# Patient Record
Sex: FEMALE | Race: BLACK OR AFRICAN AMERICAN | NOT HISPANIC OR LATINO | URBAN - NONMETROPOLITAN AREA
[De-identification: names, ages, dates, MRNs, and addresses within clinical notes are randomized per-mention and may not be internally consistent; named-entity substitution may affect disease eponyms.]

---

## 2019-07-11 ENCOUNTER — IMPORTED ENCOUNTER (OUTPATIENT)
Dept: URBAN - NONMETROPOLITAN AREA CLINIC 1 | Facility: CLINIC | Age: 64
End: 2019-07-11

## 2019-07-11 PROBLEM — H25.813: Noted: 2019-07-11

## 2019-07-11 PROCEDURE — 99214 OFFICE O/P EST MOD 30 MIN: CPT

## 2019-07-11 PROCEDURE — 92015 DETERMINE REFRACTIVE STATE: CPT

## 2019-07-11 NOTE — PATIENT DISCUSSION
Presbyopia OU- Discussed refractive status with patient- New glasses Rx given today recommend update- Continue to monitor Combined Cataracts OU- Discussed diagnosis in detail with patient- Reviewed signs and symptoms of cataract progression- Informed patient that her cataract progression could be whats affecting her vision- Mild progression noted OU- Continue to monitorFloaters OU -  Discussed findings of exam in detail with the patient. -  The risk of retinal detachment in patients with PVDs was discussed with the patient and the warning signs of retinal detachment were carefully reviewed with the patient. -  The patient was warned to return to the office or contact the ophthalmologist on call immediately if they experience signs of retinal detachment. - Conitnue to monitor; 's Notes: MR  7/11/19DFE  7/11/19

## 2022-04-09 ASSESSMENT — VISUAL ACUITY
OD_GLARE: 20/50
OU_CC: 20/25
OD_SC: 20/30-
OS_SC: 20/30-
OS_GLARE: 20/70

## 2022-04-09 ASSESSMENT — TONOMETRY
OD_IOP_MMHG: 13
OS_IOP_MMHG: 13

## 2024-06-10 ENCOUNTER — OFFICE VISIT (OUTPATIENT)
Age: 69
End: 2024-06-10
Payer: COMMERCIAL

## 2024-06-10 VITALS
SYSTOLIC BLOOD PRESSURE: 116 MMHG | HEIGHT: 66 IN | OXYGEN SATURATION: 99 % | DIASTOLIC BLOOD PRESSURE: 58 MMHG | BODY MASS INDEX: 27.64 KG/M2 | WEIGHT: 172 LBS | HEART RATE: 74 BPM

## 2024-06-10 DIAGNOSIS — I48.0 PAROXYSMAL ATRIAL FIBRILLATION (HCC): ICD-10-CM

## 2024-06-10 DIAGNOSIS — I05.9 MITRAL VALVE DISEASE: Primary | ICD-10-CM

## 2024-06-10 DIAGNOSIS — R01.1 SYSTOLIC MURMUR: ICD-10-CM

## 2024-06-10 PROCEDURE — 1123F ACP DISCUSS/DSCN MKR DOCD: CPT | Performed by: INTERNAL MEDICINE

## 2024-06-10 PROCEDURE — 99214 OFFICE O/P EST MOD 30 MIN: CPT | Performed by: INTERNAL MEDICINE

## 2024-06-10 RX ORDER — ERGOCALCIFEROL 1.25 MG/1
50000 CAPSULE ORAL WEEKLY
COMMUNITY

## 2024-06-10 ASSESSMENT — PATIENT HEALTH QUESTIONNAIRE - PHQ9
1. LITTLE INTEREST OR PLEASURE IN DOING THINGS: NOT AT ALL
SUM OF ALL RESPONSES TO PHQ9 QUESTIONS 1 & 2: 0
SUM OF ALL RESPONSES TO PHQ QUESTIONS 1-9: 0
2. FEELING DOWN, DEPRESSED OR HOPELESS: NOT AT ALL
SUM OF ALL RESPONSES TO PHQ QUESTIONS 1-9: 0

## 2024-06-10 ASSESSMENT — ENCOUNTER SYMPTOMS
GASTROINTESTINAL NEGATIVE: 1
EYES NEGATIVE: 1
SHORTNESS OF BREATH: 0
ALLERGIC/IMMUNOLOGIC NEGATIVE: 1

## 2024-06-10 NOTE — PROGRESS NOTES
Karime Duncan (:  1955) is a 69 y.o. female,Established patient, here for evaluation of the following chief complaint(s):  Follow-up    1 year ago    Subjective   SUBJECTIVE/OBJECTIVE:  HPI  Patient presents today for follow-up. She has a history of mitral valve disease. She has a history of mitral valve prolapse diagnosed when she was 18 years of age. She developed atrial fibrillation once in . She states in , while living in [place], she developed progressive shortness of breath felt due to valve regurgitation. This led to a valve repair closure operative procedure in  in [place], [place]. Since that time, she has done well without chest pain or shortness of breath. No palpitations or tachycardia. No syncope or presyncope. No orthopnea, or paroxysmal nocturnal dyspnea. No history of thyroid disease or stroke. She has a history of hypertension noted in the distant past, but is on no medication. She has hyperlipidemia and was started recently on pravastatin. LDL was under 35 mg/dL. Patient has no history of known coronary disease or heart failure. I was asked to evaluate her cardiac status and make recommendations. Patient has had chest burning, however. She does have symptoms suggestive of reflux disease; it seems worse after eating certain types of foods, and she has gotten to the point where it seems to occur relatively constantly.           I personally reviewed all available medical records, previous office notes, radiology reports and all available laboratory studies and procedural reports    No past medical history on file.     No past surgical history on file.     Allergies   Allergen Reactions    Elemental Sulfur      Other reaction(s): mild rash/itching    Sulfur      Other Reaction(s): rash/itching        Current Outpatient Medications   Medication Sig Dispense Refill    ergocalciferol (ERGOCALCIFEROL) 1.25 MG (18710 UT) capsule Take 1 capsule by mouth once a week       No current

## 2025-05-23 DIAGNOSIS — R01.1 SYSTOLIC MURMUR: ICD-10-CM

## 2025-05-23 DIAGNOSIS — I48.0 PAROXYSMAL ATRIAL FIBRILLATION (HCC): ICD-10-CM

## 2025-05-23 DIAGNOSIS — I05.9 MITRAL VALVE DISEASE: Primary | ICD-10-CM

## 2025-06-10 ENCOUNTER — OFFICE VISIT (OUTPATIENT)
Age: 70
End: 2025-06-10
Payer: MEDICARE

## 2025-06-10 VITALS
DIASTOLIC BLOOD PRESSURE: 82 MMHG | SYSTOLIC BLOOD PRESSURE: 120 MMHG | TEMPERATURE: 97 F | BODY MASS INDEX: 25.23 KG/M2 | HEART RATE: 70 BPM | HEIGHT: 66 IN | OXYGEN SATURATION: 98 % | WEIGHT: 157 LBS

## 2025-06-10 DIAGNOSIS — R01.1 SYSTOLIC MURMUR: ICD-10-CM

## 2025-06-10 DIAGNOSIS — I05.9 MITRAL VALVE DISEASE: Primary | ICD-10-CM

## 2025-06-10 DIAGNOSIS — I48.0 PAROXYSMAL ATRIAL FIBRILLATION (HCC): ICD-10-CM

## 2025-06-10 PROCEDURE — 93000 ELECTROCARDIOGRAM COMPLETE: CPT | Performed by: INTERNAL MEDICINE

## 2025-06-10 PROCEDURE — 1126F AMNT PAIN NOTED NONE PRSNT: CPT | Performed by: INTERNAL MEDICINE

## 2025-06-10 PROCEDURE — 1123F ACP DISCUSS/DSCN MKR DOCD: CPT | Performed by: INTERNAL MEDICINE

## 2025-06-10 PROCEDURE — 99215 OFFICE O/P EST HI 40 MIN: CPT | Performed by: INTERNAL MEDICINE

## 2025-06-10 ASSESSMENT — PATIENT HEALTH QUESTIONNAIRE - PHQ9
SUM OF ALL RESPONSES TO PHQ QUESTIONS 1-9: 0
1. LITTLE INTEREST OR PLEASURE IN DOING THINGS: NOT AT ALL
SUM OF ALL RESPONSES TO PHQ QUESTIONS 1-9: 0
2. FEELING DOWN, DEPRESSED OR HOPELESS: NOT AT ALL

## 2025-06-10 NOTE — PROGRESS NOTES
Karime Duncan (:  1955) is a 70 y.o. female,Established patient, here for evaluation of the following chief complaint(s):  Follow-up (1 year f/u/)    Subjective   SUBJECTIVE/OBJECTIVE:  History of Present Illness  Patient presents today for follow-up. She has a history of mitral valve disease. She has a history of mitral valve prolapse diagnosed when she was 18 years of age. She developed atrial fibrillation once in . She states in , while living in [place], she developed progressive shortness of breath felt due to valve regurgitation. This led to a valve repair closure operative procedure in  in [place], [place].     She reports a general state of well-being with no significant health concerns. In 2024, she experienced a severe illness, which she believes was influenza, requiring approximately one month for recovery. During this period, she lost weight but has since regained some of it. Recently, she had a dental cleaning and was advised by her dentist to discontinue amoxicillin. However, she understands the necessity to continue this medication due to her history of mitral valve repair. She plans to ensure she has a sufficient supply of amoxicillin for her upcoming dental appointment in 2025.    She reports no chest pain, shortness of breath, or palpitations. However, she experiences frequent lightheadedness, which she attributes to irregular eating habits, often not consuming food until late in the day. Her blood pressure readings were 104 on one side and 120 on the other. An EKG was performed during today's visit. She has a scheduled appointment with her primary care physician on Friday, during which she typically undergoes blood work.    PAST SURGICAL HISTORY:  Mitral valve repair    SOCIAL HISTORY  Marital Status:   Exercise: Plans to start riding a bicycle again    I have carefully reviewed all available medical records, previous office notes, lab, x-ray and